# Patient Record
Sex: MALE | URBAN - METROPOLITAN AREA
[De-identification: names, ages, dates, MRNs, and addresses within clinical notes are randomized per-mention and may not be internally consistent; named-entity substitution may affect disease eponyms.]

---

## 2022-12-12 ENCOUNTER — OFFICE VISIT (OUTPATIENT)
Dept: URBAN - METROPOLITAN AREA CLINIC 80 | Facility: CLINIC | Age: 52
End: 2022-12-12

## 2023-05-03 ENCOUNTER — OFFICE VISIT (OUTPATIENT)
Dept: URBAN - METROPOLITAN AREA SURGERY CENTER 19 | Facility: SURGERY CENTER | Age: 53
End: 2023-05-03
Payer: COMMERCIAL

## 2023-05-03 DIAGNOSIS — Z12.11 COLON CANCER SCREENING: ICD-10-CM

## 2023-05-03 DIAGNOSIS — K63.5 BENIGN COLON POLYP: ICD-10-CM

## 2023-05-03 DIAGNOSIS — Z80.0 FAMILY HISTORY OF CANCER OF DIGESTIVE SYSTEM: ICD-10-CM

## 2023-05-03 PROCEDURE — G8907 PT DOC NO EVENTS ON DISCHARG: HCPCS | Performed by: INTERNAL MEDICINE

## 2023-05-03 PROCEDURE — 45385 COLONOSCOPY W/LESION REMOVAL: CPT | Performed by: INTERNAL MEDICINE

## 2025-06-24 ENCOUNTER — DASHBOARD ENCOUNTERS (OUTPATIENT)
Age: 55
End: 2025-06-24

## 2025-06-24 ENCOUNTER — OFFICE VISIT (OUTPATIENT)
Dept: URBAN - METROPOLITAN AREA CLINIC 2 | Facility: CLINIC | Age: 55
End: 2025-06-24
Payer: COMMERCIAL

## 2025-06-24 DIAGNOSIS — K57.90 DIVERTICULOSIS: ICD-10-CM

## 2025-06-24 DIAGNOSIS — Z12.11 COLON CANCER SCREENING: ICD-10-CM

## 2025-06-24 PROBLEM — 397881000: Status: ACTIVE | Noted: 2025-06-24

## 2025-06-24 PROCEDURE — 99213 OFFICE O/P EST LOW 20 MIN: CPT | Performed by: INTERNAL MEDICINE

## 2025-06-24 RX ORDER — TAMSULOSIN HYDROCHLORIDE 0.4 MG/1
1 CAPSULE CAPSULE ORAL ONCE A DAY
Status: ACTIVE | COMMUNITY

## 2025-06-24 NOTE — HPI-TODAY'S VISIT:
He comes in today to discuss recent CT results.  He had a CT for flank pain related to kidney stones. On the scan he was found to have diverticulosis which was also noted at his colonoscopy in 2023.  He wanted to discuss diverticulosis.  He has no issues with constipation.